# Patient Record
Sex: FEMALE | Race: BLACK OR AFRICAN AMERICAN | NOT HISPANIC OR LATINO | URBAN - METROPOLITAN AREA
[De-identification: names, ages, dates, MRNs, and addresses within clinical notes are randomized per-mention and may not be internally consistent; named-entity substitution may affect disease eponyms.]

---

## 2018-08-13 ENCOUNTER — IMPORTED ENCOUNTER (OUTPATIENT)
Dept: URBAN - METROPOLITAN AREA CLINIC 9 | Facility: CLINIC | Age: 63
End: 2018-08-13

## 2019-01-25 NOTE — PATIENT DISCUSSION
Resolved, now with infiltrates.  Decrease Besivance to bid OD.  Add Pred-forte qid OD x2d, then bid x2d then D/C.

## 2020-10-06 ENCOUNTER — IMPORTED ENCOUNTER (OUTPATIENT)
Dept: URBAN - METROPOLITAN AREA CLINIC 9 | Facility: CLINIC | Age: 65
End: 2020-10-06

## 2020-10-06 PROBLEM — H43.813: Noted: 2020-10-06

## 2020-10-06 PROBLEM — H43.813: Noted: 2020-10-06 | Resolved: 2020-10-06

## 2020-10-06 PROBLEM — H25.13: Noted: 2020-10-06

## 2020-10-06 PROBLEM — H25.11: Noted: 2020-10-06

## 2021-04-06 ENCOUNTER — IMPORTED ENCOUNTER (OUTPATIENT)
Dept: URBAN - METROPOLITAN AREA CLINIC 9 | Facility: CLINIC | Age: 66
End: 2021-04-06

## 2021-10-06 ENCOUNTER — ESTABLISHED PATIENT (OUTPATIENT)
Dept: URBAN - METROPOLITAN AREA CLINIC 17 | Facility: CLINIC | Age: 66
End: 2021-10-06

## 2021-10-06 ASSESSMENT — VISUAL ACUITY
OS_CC: 20/30
OD_CC: 20/40
OD_PH: 20/30

## 2021-10-06 ASSESSMENT — TONOMETRY
OS_IOP_MMHG: 16
OD_IOP_MMHG: 17

## 2021-10-18 ASSESSMENT — VISUAL ACUITY
OS_CC: 20/25 -2 SN
OD_CC: 20/25 - SN
OD_SC: 20/40 -2 SN
OS_SC: 20/40 - SN
OD_CC: 20/20 - SN
OS_SC: 20/40 -2 SN
OD_SC: 20/50 SN
OD_SC: 20/40 SN
OS_CC: 20/20 - SN
OS_SC: 20/40 -2 SN

## 2021-10-18 ASSESSMENT — TONOMETRY
OS_IOP_MMHG: 18
OS_IOP_MMHG: 18
OD_IOP_MMHG: 18
OS_IOP_MMHG: 18
OD_IOP_MMHG: 19
OD_IOP_MMHG: 17

## 2022-04-13 ENCOUNTER — ESTABLISHED PATIENT (OUTPATIENT)
Dept: URBAN - METROPOLITAN AREA CLINIC 17 | Facility: CLINIC | Age: 67
End: 2022-04-13

## 2022-04-13 DIAGNOSIS — H52.4: ICD-10-CM

## 2022-04-13 DIAGNOSIS — H25.13: ICD-10-CM

## 2022-04-13 DIAGNOSIS — H43.813: ICD-10-CM

## 2022-04-13 DIAGNOSIS — E11.9: ICD-10-CM

## 2022-04-13 PROCEDURE — 99214 OFFICE O/P EST MOD 30 MIN: CPT

## 2022-04-13 PROCEDURE — 92134 CPTRZ OPH DX IMG PST SGM RTA: CPT

## 2022-04-13 ASSESSMENT — VISUAL ACUITY
OD_CC: 20/30+1
OS_CC: 20/25-2

## 2022-04-13 ASSESSMENT — TONOMETRY
OD_IOP_MMHG: 18
OS_IOP_MMHG: 17

## 2022-10-10 ENCOUNTER — ESTABLISHED PATIENT (OUTPATIENT)
Dept: URBAN - METROPOLITAN AREA CLINIC 17 | Facility: CLINIC | Age: 67
End: 2022-10-10

## 2022-10-10 DIAGNOSIS — H25.13: ICD-10-CM

## 2022-10-10 DIAGNOSIS — E11.9: ICD-10-CM

## 2022-10-10 DIAGNOSIS — H52.4: ICD-10-CM

## 2022-10-10 DIAGNOSIS — H43.813: ICD-10-CM

## 2022-10-10 PROCEDURE — 99214 OFFICE O/P EST MOD 30 MIN: CPT

## 2022-10-10 PROCEDURE — 92134 CPTRZ OPH DX IMG PST SGM RTA: CPT

## 2022-10-10 PROCEDURE — 92015 DETERMINE REFRACTIVE STATE: CPT

## 2022-10-10 ASSESSMENT — VISUAL ACUITY
OS_CC: 20/40-2
OD_CC: 20/40-2

## 2022-10-10 ASSESSMENT — TONOMETRY
OS_IOP_MMHG: 18
OD_IOP_MMHG: 15

## 2023-05-10 ENCOUNTER — ESTABLISHED PATIENT (OUTPATIENT)
Dept: URBAN - METROPOLITAN AREA CLINIC 17 | Facility: CLINIC | Age: 68
End: 2023-05-10

## 2023-05-10 DIAGNOSIS — H43.813: ICD-10-CM

## 2023-05-10 DIAGNOSIS — H25.13: ICD-10-CM

## 2023-05-10 DIAGNOSIS — H52.4: ICD-10-CM

## 2023-05-10 DIAGNOSIS — E11.9: ICD-10-CM

## 2023-05-10 PROCEDURE — 99214 OFFICE O/P EST MOD 30 MIN: CPT

## 2023-05-10 PROCEDURE — 92134 CPTRZ OPH DX IMG PST SGM RTA: CPT

## 2023-05-10 ASSESSMENT — VISUAL ACUITY
OS_CC: 20/40-
OD_CC: 20/50-

## 2023-05-10 ASSESSMENT — TONOMETRY
OD_IOP_MMHG: 18
OS_IOP_MMHG: 16

## 2023-10-03 ENCOUNTER — ESTABLISHED PATIENT (OUTPATIENT)
Dept: URBAN - METROPOLITAN AREA CLINIC 17 | Facility: CLINIC | Age: 68
End: 2023-10-03

## 2023-10-03 DIAGNOSIS — H25.13: ICD-10-CM

## 2023-10-03 DIAGNOSIS — H43.813: ICD-10-CM

## 2023-10-03 DIAGNOSIS — E11.9: ICD-10-CM

## 2023-10-03 PROCEDURE — 92136 OPHTHALMIC BIOMETRY: CPT

## 2023-10-03 PROCEDURE — 99214 OFFICE O/P EST MOD 30 MIN: CPT

## 2023-10-03 ASSESSMENT — KERATOMETRY
OD_AXISANGLE2_DEGREES: 89
OD_K2POWER_DIOPTERS: 44.75
OD_AXISANGLE_DEGREES: 179
OS_K1POWER_DIOPTERS: 42.75
OS_AXISANGLE2_DEGREES: 98
OS_AXISANGLE_DEGREES: 008
OD_K1POWER_DIOPTERS: 43.00
OS_K2POWER_DIOPTERS: 44.00

## 2023-10-03 ASSESSMENT — TONOMETRY
OS_IOP_MMHG: 13
OD_IOP_MMHG: 17

## 2023-10-03 ASSESSMENT — VISUAL ACUITY
OD_SC: 20/60
OU_SC: 20/40
OS_PH: 20/30
OD_PH: 20/30-1
OS_SC: 20/40

## 2023-11-03 ENCOUNTER — POST-OP (OUTPATIENT)
Dept: URBAN - METROPOLITAN AREA CLINIC 17 | Facility: CLINIC | Age: 68
End: 2023-11-03

## 2023-11-03 DIAGNOSIS — Z96.1: ICD-10-CM

## 2023-11-03 PROCEDURE — 99024 POSTOP FOLLOW-UP VISIT: CPT

## 2023-11-03 ASSESSMENT — KERATOMETRY
OD_AXISANGLE_DEGREES: 179
OS_AXISANGLE2_DEGREES: 98
OD_K2POWER_DIOPTERS: 44.75
OD_K1POWER_DIOPTERS: 43.00
OD_AXISANGLE2_DEGREES: 89
OS_K1POWER_DIOPTERS: 42.75
OS_AXISANGLE_DEGREES: 008
OS_K2POWER_DIOPTERS: 44.00

## 2023-11-03 ASSESSMENT — TONOMETRY
OS_IOP_MMHG: 36
OS_IOP_MMHG: 30
OS_IOP_MMHG: 41
OS_IOP_MMHG: 24

## 2023-11-03 ASSESSMENT — VISUAL ACUITY: OS_SC: 20/40

## 2023-11-06 ENCOUNTER — POST OP/EVAL OF SECOND EYE (OUTPATIENT)
Dept: URBAN - METROPOLITAN AREA CLINIC 17 | Facility: CLINIC | Age: 68
End: 2023-11-06

## 2023-11-06 VITALS — DIASTOLIC BLOOD PRESSURE: 88 MMHG | HEART RATE: 72 BPM | HEIGHT: 55 IN | SYSTOLIC BLOOD PRESSURE: 150 MMHG

## 2023-11-06 DIAGNOSIS — H25.11: ICD-10-CM

## 2023-11-06 DIAGNOSIS — Z96.1: ICD-10-CM

## 2023-11-06 PROCEDURE — 99024 POSTOP FOLLOW-UP VISIT: CPT

## 2023-11-06 PROCEDURE — 92136 OPHTHALMIC BIOMETRY: CPT

## 2023-11-06 ASSESSMENT — VISUAL ACUITY: OS_SC: 20/25

## 2023-11-06 ASSESSMENT — KERATOMETRY
OD_AXISANGLE2_DEGREES: 89
OS_AXISANGLE2_DEGREES: 98
OS_K1POWER_DIOPTERS: 42.75
OD_K1POWER_DIOPTERS: 43.00
OS_K2POWER_DIOPTERS: 44.00
OD_AXISANGLE_DEGREES: 179
OD_K2POWER_DIOPTERS: 44.75
OS_AXISANGLE_DEGREES: 008

## 2023-11-06 ASSESSMENT — TONOMETRY: OS_IOP_MMHG: 22

## 2023-11-10 ENCOUNTER — POST-OP (OUTPATIENT)
Dept: URBAN - METROPOLITAN AREA CLINIC 17 | Facility: CLINIC | Age: 68
End: 2023-11-10

## 2023-11-10 DIAGNOSIS — Z96.1: ICD-10-CM

## 2023-11-10 PROCEDURE — 99024 POSTOP FOLLOW-UP VISIT: CPT

## 2023-11-10 ASSESSMENT — TONOMETRY
OD_IOP_MMHG: 32
OD_IOP_MMHG: 25
OD_IOP_MMHG: 22
OD_IOP_MMHG: 28

## 2023-11-10 ASSESSMENT — KERATOMETRY
OD_K1POWER_DIOPTERS: 43.00
OS_AXISANGLE_DEGREES: 008
OS_AXISANGLE2_DEGREES: 98
OS_K2POWER_DIOPTERS: 44.00
OD_AXISANGLE2_DEGREES: 89
OD_K2POWER_DIOPTERS: 44.75
OS_K1POWER_DIOPTERS: 42.75
OD_AXISANGLE_DEGREES: 179

## 2023-11-10 ASSESSMENT — VISUAL ACUITY: OD_SC: 20/30

## 2023-12-07 ENCOUNTER — POST-OP (OUTPATIENT)
Dept: URBAN - METROPOLITAN AREA CLINIC 17 | Facility: CLINIC | Age: 68
End: 2023-12-07

## 2023-12-07 DIAGNOSIS — Z96.1: ICD-10-CM

## 2023-12-07 PROCEDURE — 99024 POSTOP FOLLOW-UP VISIT: CPT

## 2023-12-07 ASSESSMENT — TONOMETRY
OS_IOP_MMHG: 20
OD_IOP_MMHG: 20
OD_IOP_MMHG: 22
OS_IOP_MMHG: 19

## 2023-12-07 ASSESSMENT — VISUAL ACUITY
OS_SC: 20/25
OD_SC: 20/30-2

## 2023-12-07 ASSESSMENT — KERATOMETRY
OS_K1POWER_DIOPTERS: 42.75
OS_AXISANGLE2_DEGREES: 98
OS_AXISANGLE_DEGREES: 008
OS_K2POWER_DIOPTERS: 44.00
OD_AXISANGLE2_DEGREES: 89
OD_K1POWER_DIOPTERS: 43.00
OD_AXISANGLE_DEGREES: 179
OD_K2POWER_DIOPTERS: 44.75

## 2024-03-08 ENCOUNTER — FOLLOW UP (OUTPATIENT)
Dept: URBAN - METROPOLITAN AREA CLINIC 17 | Facility: CLINIC | Age: 69
End: 2024-03-08

## 2024-03-08 DIAGNOSIS — Z96.1: ICD-10-CM

## 2024-03-08 DIAGNOSIS — H04.123: ICD-10-CM

## 2024-03-08 PROCEDURE — 99213 OFFICE O/P EST LOW 20 MIN: CPT

## 2024-03-08 ASSESSMENT — KERATOMETRY
OD_K2POWER_DIOPTERS: 44.75
OD_K1POWER_DIOPTERS: 43.00
OS_K2POWER_DIOPTERS: 44.00
OS_AXISANGLE_DEGREES: 008
OS_AXISANGLE2_DEGREES: 98
OD_AXISANGLE2_DEGREES: 89
OD_AXISANGLE_DEGREES: 179
OS_K1POWER_DIOPTERS: 42.75

## 2024-03-08 ASSESSMENT — VISUAL ACUITY
OS_SC: 20/30
OD_SC: 20/30-1

## 2024-03-08 ASSESSMENT — TONOMETRY
OS_IOP_MMHG: 13
OD_IOP_MMHG: 14

## 2025-03-17 ENCOUNTER — COMPREHENSIVE EXAM (OUTPATIENT)
Age: 70
End: 2025-03-17

## 2025-03-17 DIAGNOSIS — H04.123: ICD-10-CM

## 2025-03-17 DIAGNOSIS — E11.9: ICD-10-CM

## 2025-03-17 DIAGNOSIS — H43.813: ICD-10-CM

## 2025-03-17 PROCEDURE — 92134 CPTRZ OPH DX IMG PST SGM RTA: CPT

## 2025-03-17 PROCEDURE — 99214 OFFICE O/P EST MOD 30 MIN: CPT

## 2025-03-17 PROCEDURE — 92015 DETERMINE REFRACTIVE STATE: CPT
